# Patient Record
Sex: FEMALE | Race: OTHER | URBAN - METROPOLITAN AREA
[De-identification: names, ages, dates, MRNs, and addresses within clinical notes are randomized per-mention and may not be internally consistent; named-entity substitution may affect disease eponyms.]

---

## 2018-04-01 ENCOUNTER — EMERGENCY (EMERGENCY)
Facility: HOSPITAL | Age: 68
LOS: 1 days | Discharge: HOME | End: 2018-04-01
Attending: EMERGENCY MEDICINE | Admitting: INTERNAL MEDICINE
Payer: MEDICARE

## 2018-04-01 VITALS
SYSTOLIC BLOOD PRESSURE: 153 MMHG | RESPIRATION RATE: 17 BRPM | OXYGEN SATURATION: 100 % | HEART RATE: 74 BPM | DIASTOLIC BLOOD PRESSURE: 95 MMHG | TEMPERATURE: 98 F

## 2018-04-01 DIAGNOSIS — I63.9 CEREBRAL INFARCTION, UNSPECIFIED: ICD-10-CM

## 2018-04-01 DIAGNOSIS — Z91.013 ALLERGY TO SEAFOOD: ICD-10-CM

## 2018-04-01 DIAGNOSIS — R11.0 NAUSEA: ICD-10-CM

## 2018-04-01 LAB
ALBUMIN SERPL ELPH-MCNC: 3.6 G/DL — SIGNIFICANT CHANGE UP (ref 3.3–5)
ALP SERPL-CCNC: 79 U/L — SIGNIFICANT CHANGE UP (ref 40–120)
ALT FLD-CCNC: 28 U/L — SIGNIFICANT CHANGE UP (ref 12–78)
ANION GAP SERPL CALC-SCNC: 9 MMOL/L — SIGNIFICANT CHANGE UP (ref 5–17)
APTT BLD: 31.3 SEC — SIGNIFICANT CHANGE UP (ref 27.5–37.4)
AST SERPL-CCNC: 23 U/L — SIGNIFICANT CHANGE UP (ref 15–37)
BASOPHILS # BLD AUTO: 0.05 K/UL — SIGNIFICANT CHANGE UP (ref 0–0.2)
BASOPHILS NFR BLD AUTO: 0.9 % — SIGNIFICANT CHANGE UP (ref 0–2)
BILIRUB SERPL-MCNC: 0.3 MG/DL — SIGNIFICANT CHANGE UP (ref 0.2–1.2)
BLD GP AB SCN SERPL QL: SIGNIFICANT CHANGE UP
BUN SERPL-MCNC: 11 MG/DL — SIGNIFICANT CHANGE UP (ref 7–23)
CALCIUM SERPL-MCNC: 8.8 MG/DL — SIGNIFICANT CHANGE UP (ref 8.5–10.1)
CHLORIDE SERPL-SCNC: 106 MMOL/L — SIGNIFICANT CHANGE UP (ref 96–108)
CK MB BLD-MCNC: 1.9 % — SIGNIFICANT CHANGE UP (ref 0–3.5)
CK MB CFR SERPL CALC: 1.2 NG/ML — SIGNIFICANT CHANGE UP (ref 0.5–3.6)
CK SERPL-CCNC: 63 U/L — SIGNIFICANT CHANGE UP (ref 26–192)
CO2 SERPL-SCNC: 28 MMOL/L — SIGNIFICANT CHANGE UP (ref 22–31)
CREAT SERPL-MCNC: 0.55 MG/DL — SIGNIFICANT CHANGE UP (ref 0.5–1.3)
EOSINOPHIL # BLD AUTO: 0.18 K/UL — SIGNIFICANT CHANGE UP (ref 0–0.5)
EOSINOPHIL NFR BLD AUTO: 3.1 % — SIGNIFICANT CHANGE UP (ref 0–6)
GLUCOSE BLDC GLUCOMTR-MCNC: 100 MG/DL — HIGH (ref 70–99)
GLUCOSE SERPL-MCNC: 111 MG/DL — HIGH (ref 70–99)
HCT VFR BLD CALC: 37.7 % — SIGNIFICANT CHANGE UP (ref 34.5–45)
HGB BLD-MCNC: 12.3 G/DL — SIGNIFICANT CHANGE UP (ref 11.5–15.5)
IMM GRANULOCYTES NFR BLD AUTO: 0.2 % — SIGNIFICANT CHANGE UP (ref 0–1.5)
INR BLD: 1.03 RATIO — SIGNIFICANT CHANGE UP (ref 0.88–1.16)
LYMPHOCYTES # BLD AUTO: 1.8 K/UL — SIGNIFICANT CHANGE UP (ref 1–3.3)
LYMPHOCYTES # BLD AUTO: 31 % — SIGNIFICANT CHANGE UP (ref 13–44)
MCHC RBC-ENTMCNC: 28.3 PG — SIGNIFICANT CHANGE UP (ref 27–34)
MCHC RBC-ENTMCNC: 32.6 GM/DL — SIGNIFICANT CHANGE UP (ref 32–36)
MCV RBC AUTO: 86.7 FL — SIGNIFICANT CHANGE UP (ref 80–100)
MONOCYTES # BLD AUTO: 0.39 K/UL — SIGNIFICANT CHANGE UP (ref 0–0.9)
MONOCYTES NFR BLD AUTO: 6.7 % — SIGNIFICANT CHANGE UP (ref 2–14)
NEUTROPHILS # BLD AUTO: 3.38 K/UL — SIGNIFICANT CHANGE UP (ref 1.8–7.4)
NEUTROPHILS NFR BLD AUTO: 58.1 % — SIGNIFICANT CHANGE UP (ref 43–77)
NRBC # BLD: 0 /100 WBCS — SIGNIFICANT CHANGE UP (ref 0–0)
PLATELET # BLD AUTO: 229 K/UL — SIGNIFICANT CHANGE UP (ref 150–400)
POTASSIUM SERPL-MCNC: 3.9 MMOL/L — SIGNIFICANT CHANGE UP (ref 3.5–5.3)
POTASSIUM SERPL-SCNC: 3.9 MMOL/L — SIGNIFICANT CHANGE UP (ref 3.5–5.3)
PROT SERPL-MCNC: 7.2 GM/DL — SIGNIFICANT CHANGE UP (ref 6–8.3)
PROTHROM AB SERPL-ACNC: 11.2 SEC — SIGNIFICANT CHANGE UP (ref 9.8–12.7)
RBC # BLD: 4.35 M/UL — SIGNIFICANT CHANGE UP (ref 3.8–5.2)
RBC # FLD: 13.2 % — SIGNIFICANT CHANGE UP (ref 10.3–14.5)
SODIUM SERPL-SCNC: 143 MMOL/L — SIGNIFICANT CHANGE UP (ref 135–145)
TROPONIN I SERPL-MCNC: <.015 NG/ML — SIGNIFICANT CHANGE UP (ref 0.01–0.04)
WBC # BLD: 5.81 K/UL — SIGNIFICANT CHANGE UP (ref 3.8–10.5)
WBC # FLD AUTO: 5.81 K/UL — SIGNIFICANT CHANGE UP (ref 3.8–10.5)

## 2018-04-01 PROCEDURE — 70450 CT HEAD/BRAIN W/O DYE: CPT | Mod: 26

## 2018-04-01 PROCEDURE — 71045 X-RAY EXAM CHEST 1 VIEW: CPT | Mod: 26

## 2018-04-01 PROCEDURE — 99291 CRITICAL CARE FIRST HOUR: CPT

## 2018-04-01 PROCEDURE — 93010 ELECTROCARDIOGRAM REPORT: CPT

## 2018-04-01 RX ORDER — ASPIRIN/CALCIUM CARB/MAGNESIUM 324 MG
1 TABLET ORAL
Qty: 0 | Refills: 0 | COMMUNITY

## 2018-04-01 RX ORDER — ASPIRIN/CALCIUM CARB/MAGNESIUM 324 MG
81 TABLET ORAL DAILY
Qty: 0 | Refills: 0 | Status: DISCONTINUED | OUTPATIENT
Start: 2018-04-01 | End: 2018-04-02

## 2018-04-01 NOTE — ED PROVIDER NOTE - PROGRESS NOTE DETAILS
Telestroke consult Dr. Schmitt agreed that risks outweigh benefits for tpa given slight and improving symptoms. Pt and family agree, and pt admitted for further evaluation.

## 2018-04-01 NOTE — ED PROVIDER NOTE - OBJECTIVE STATEMENT
Pt is a 67 yo lady with a pmhx of TIA, who presents to the ED with L. sided hand heaviness and numbness. This started around 11:30 AM while with her daughter. No headache, no chest pain, no sob, no slurred speech, no facial droop. Feels hand is slightly improved from onset, but still feels abnormal. On baby asa, no anticoagulation. No abdominal pain.

## 2018-04-01 NOTE — ED ADULT TRIAGE NOTE - CHIEF COMPLAINT QUOTE
home became lightheaded, floaters in eyes, nausea, takes no meds, no hx, poss tia few years back not confirmed.  no allergies

## 2018-04-01 NOTE — CONSULT NOTE ADULT - ASSESSMENT
Historian:     Patient.  ER notes also reviewed.  HPI:  MAUREEN CAGLE.  Pt is a 67 yo lady with a pmhx of TIA, who presents to the ED with L. sided hand heaviness and numbness. This started around 11:30 AM while with her daughter. No headache, no chest pain, no sob, no slurred speech, no facial droop. Feels hand is slightly improved from onset, but still feels abnormal. On baby asa, no anticoagulation. No abdominal pain. (2018 16:46)      PAST MEDICAL & SURGICAL HISTORY:  No pertinent past medical history  Herniated disc  Cataract of right eye   deliv NOS-unsp  68yFemale    MEDICATIONS  (STANDING):  aspirin enteric coated 81 milliGRAM(s) Oral daily    MEDICATIONS  (PRN):      Allergies    No Known Drug Allergies  shellfish (Pruritus; Rash)    Intolerances      FAMILY HISTORY:    Vital Signs Last 24 Hrs  T(C): 36.9 (2018 16:10), Max: 36.9 (2018 16:10)  T(F): 98.4 (2018 16:10), Max: 98.4 (2018 16:10)  HR: 74 (2018 16:10) (74 - 74)  BP: 142/71 (2018 16:10) (142/71 - 153/95)  BP(mean): --  RR: 16 (2018 16:10) (16 - 17)  SpO2: 100% (2018 16:10) (100% - 100%)    NEUROLOGICAL EXAM:  HENT:  Normocephalic head; atraumatic head.  Neck supple.  ENT: normal looking.  Mental State:    Alert.  Fully oriented to person, place and date.  Coherent.  Speech clear and intact.  Cooperative.  Responds appropriately.    Cranial Nerves:  II-XII:   Pupils round and reactive to light and accommodation.  Extraocular movements full.  Visual fields full (no homonymous hemianopsia).  Visual acuity wnl.  Facial symmetry intact.  Tongue midline.  Motor Functions:  Moves all extremities.  No pronator drift of UE.  Claps hand well.  Hand  intact bilaterally.  Ambulatory.    Sensory Functions:   Intact to touch and pinprick to face and extremities.    Reflexes:  Deep tendon reflexes normoactive to biceps, knees and ankles.  Babinski absent (present).  Cerebellar Testing:    Finger to nose intact.  Nystagmus absent.  Neurovascular: Carotid auscultation full without bruits.      LABS:                        12.3   5.81  )-----------( 229      ( 2018 14:08 )             37.7         143  |  106  |  11  ----------------------------<  111<H>  3.9   |  28  |  0.55    Ca    8.8      2018 14:08    TPro  7.2  /  Alb  3.6  /  TBili  0.3  /  DBili  x   /  AST  23  /  ALT  28  /  AlkPhos  79      PT/INR - ( 2018 14:08 )   PT: 11.2 sec;   INR: 1.03 ratio         PTT - ( 2018 14:08 )  PTT:31.3 sec        RADIOLOGY & ADDITIONAL STUDIES:    Vital Signs:     Frequency:  every 15 minutes    Additional Instructions:  CODE STROKE ( @ 13:36)  Blood Glucose Point Of Care Testing:     Frequency:  once    Additional Instructions:  CODE STROKE ( @ 13:36)  Assess Neurological Status:     Type of Neuro Check:  Stroke    Frequency:  every 15 minutes    Additional Instructions:  CODE STROKE ( @ 13:36)  Cardiac Monitor Bedside:     Time/Priority:  Routine    Additional Instructions:  CODE STROKE; Continuous ( @ 13:36)  Pulse Oximetry:   Frequency: <Continuous>    Additional Instructions:  CODE STROKE ( @ 13:36)  Diet, NPO:      Special Instructions for Nursing:  CODE STROKE; NPO until Dysphagia screen or Speech Bedside Swallow Evaluation completed and passed ( @ 13:36)  Comprehensive Metabolic Panel: STAT ( @ 13:36)  Complete Blood Count + Automated Diff: STAT ( @ 13:36)  Activated Partial Thromboplastin Time:  Start Date:2018. STAT ( @ 13:36)  Prothrombin Time and INR, Plasma:  Start Date:2018. STAT ( @ 13:36)  Creatine Kinase, Serum: STAT ( @ 13:36)  CKMB Mass Assay: STAT ( @ 13:36)  Troponin I, Serum: STAT ( @ 13:36)  Xray Chest 1 View AP/PA.: Urgent   Indication: stroke  Transport: Portable  Exam Completed  Provider's Contact #: [phone] ( @ 13:36)  12 Lead ECG:   Provider's Contact #: [phone] ( @ 13:36)  Type + Screen: STAT ( @ 13:36)  ABO Rh Confirmatory Specimen: STAT  Addl Info: Conditional: ABO Rh Confirmatory Specimen ( @ 13:36)  CT Brain Stroke Protocol: Urgent   Indication: Code Stroke  Transport: Stretcher-Crib  Exam Completed  Provider's Contact #: [phone] ( @ 13:36)  Nucleated RBC: 14:00 ( @ 14:09)  Antibody Screen: 14:00 ( @ 14:09)  POCT  Blood Glucose: 15:00 ( @ 15:05)  Admit to Inpatient Level of Care.:     Service:  TELEMETRY    Physician:  Margarita Camp    Additional Instructions:  Diagnosis: Cerebrovascular accident (CVA), unspecified mechanism  Isolation: None  Special Consideration: None ( @ 15:17)  Admit from ED ( @ 15:45)  Admit to Inpatient Level of Care.:     LVS 2D    Service:  Telemetry    Physician:  tasha ( @ 16:45)  aspirin enteric coated: [Known as Ecotrin]  81 milliGRAM(s), Oral, daily  Administration Instructions: swallow whole * don't crush/chew  Provider's Contact #: [phone] ( @ 16:47)  TTE Echo Doppler w/o Cont:   Transport: Stretcher-Crib  Monitor: w/o Monitor  Provider's Contact #: [phone] ( @ 16:49)  US Duplex Carotid Arteries Complete, Bilateral: Routine   Indication: cva  Transport: Stretcher-Crib  Provider's Contact #: [phone] ( @ 16:49)  MR Head No Cont: Routine   Indication: tia  Transport: Stretcher-Crib  Provider's Contact #: [phone] ( @ 16:49)      Assessment & Opinion:  Probable transient ischemic attack.  Presently with unremarkable neurologic exam    Recommendations:  Brain MRI.  Carotid doppler.  Echocardiogram.  EEG.   DVT prophylaxis as ordered.  Medications:  Continue all meds Historian:     Patient.  ER notes also reviewed.  HPI:  MAUREEN CAGLE.  Pt is a 67 yo lady with a pmhx of TIA, who presents to the ED with L. sided hand heaviness and numbness. This started around 11:30 AM while with her daughter. No headache, no chest pain, no sob, no slurred speech, no facial droop. Feels hand is slightly improved from onset, but still feels abnormal. On baby asa, no anticoagulation. No abdominal pain. (2018 16:46)    PAST MEDICAL & SURGICAL HISTORY: No pertinent past medical history; Herniated disc; Cataract of right eye;  deliv NOS-unsp    MEDICATIONS  (STANDING):  aspirin enteric coated 81 milliGRAM(s) Oral daily    MEDICATIONS  (PRN):  Allergies: No Known Drug Allergies  shellfish (Pruritus; Rash)    Intolerances    FAMILY HISTORY:    Vital Signs Last 24 Hrs  T(C): 36.9 (2018 16:10), Max: 36.9 (2018 16:10)  T(F): 98.4 (2018 16:10), Max: 98.4 (2018 16:10)  HR: 74 (2018 16:10) (74 - 74)  BP: 142/71 (2018 16:10) (142/71 - 153/95)  BP(mean): --  RR: 16 (2018 16:10) (16 - 17)  SpO2: 100% (2018 16:10) (100% - 100%)    NEUROLOGICAL EXAM:  HENT:  Normocephalic head; atraumatic head.  Neck supple.  ENT: normal looking.  Mental State:    Alert.  Fully oriented to person, place and date.  Coherent.  Speech clear and intact.  Cooperative.  Responds appropriately.    Cranial Nerves:  II-XII:   Pupils round and reactive to light and accommodation.  Extraocular movements full.  Visual fields full (no homonymous hemianopsia).  Visual acuity wnl.  Facial symmetry intact.  Tongue midline.  Motor Functions:  Moves all extremities.  No pronator drift of UE.  Claps hand well.  Hand  intact bilaterally.  Ambulatory.    Sensory Functions:   Intact to touch and pinprick to face and extremities.    Reflexes:  Deep tendon reflexes normoactive to biceps, knees and ankles.  Babinski absent (present).  Cerebellar Testing:    Finger to nose intact.  Nystagmus absent.  Neurovascular: Carotid auscultation full without bruits.      LABS:                        12.3   5.81  )-----------( 229      ( 2018 14:08 )             37.7         143  |  106  |  11  ----------------------------<  111<H>  3.9   |  28  |  0.55    Ca    8.8      2018 14:08    TPro  7.2  /  Alb  3.6  /  TBili  0.3  /  DBili  x   /  AST  23  /  ALT  28  /  AlkPhos  79      PT/INR - ( 2018 14:08 )   PT: 11.2 sec;   INR: 1.03 ratio         PTT - ( 2018 14:08 )  PTT:31.3 sec        RADIOLOGY & ADDITIONAL STUDIES:    Vital Signs:     Frequency:  every 15 minutes    Additional Instructions:  CODE STROKE ( @ 13:36)  Blood Glucose Point Of Care Testing:     Frequency:  once    Additional Instructions:  CODE STROKE ( @ 13:36)  Assess Neurological Status:     Type of Neuro Check:  Stroke    Frequency:  every 15 minutes    Additional Instructions:  CODE STROKE ( @ 13:36)  Cardiac Monitor Bedside:     Time/Priority:  Routine    Additional Instructions:  CODE STROKE; Continuous ( @ 13:36)  Pulse Oximetry:   Frequency: <Continuous>    Additional Instructions:  CODE STROKE ( @ 13:36)  Diet, NPO:      Special Instructions for Nursing:  CODE STROKE; NPO until Dysphagia screen or Speech Bedside Swallow Evaluation completed and passed ( @ 13:36)  Comprehensive Metabolic Panel: STAT ( @ 13:36)  Complete Blood Count + Automated Diff: STAT ( @ 13:36)  Activated Partial Thromboplastin Time:  Start Date:2018. STAT ( @ 13:36)  Prothrombin Time and INR, Plasma:  Start Date:2018. STAT ( @ 13:36)  Creatine Kinase, Serum: STAT ( @ 13:36)  CKMB Mass Assay: STAT ( @ 13:36)  Troponin I, Serum: STAT ( @ 13:36)  Xray Chest 1 View AP/PA.: Urgent   Indication: stroke  Transport: Portable  Exam Completed  Provider's Contact #: [phone] ( @ 13:36)  12 Lead ECG:   Provider's Contact #: [phone] ( @ 13:36)  Type + Screen: STAT ( @ 13:36)  ABO Rh Confirmatory Specimen: STAT  Addl Info: Conditional: ABO Rh Confirmatory Specimen ( @ 13:36)  CT Brain Stroke Protocol: Urgent   Indication: Code Stroke  Transport: Stretcher-Crib  Exam Completed  Provider's Contact #: [phone] ( @ 13:36)  Nucleated RBC: 14:00 ( @ 14:09)  Antibody Screen: 14:00 ( @ 14:09)  POCT  Blood Glucose: 15:00 ( @ 15:05)  Admit to Inpatient Level of Care.:     Service:  TELEMETRY    Physician:  Margarita Camp    Additional Instructions:  Diagnosis: Cerebrovascular accident (CVA), unspecified mechanism  Isolation: None  Special Consideration: None ( @ 15:17)  Admit from ED ( @ 15:45)  Admit to Inpatient Level of Care.:     LVS 2D    Service:  Telemetry    Physician:  tasha ( @ 16:45)  aspirin enteric coated: [Known as Ecotrin]  81 milliGRAM(s), Oral, daily  Administration Instructions: swallow whole * don't crush/chew  Provider's Contact #: [phone] ( @ 16:47)  TTE Echo Doppler w/o Cont:   Transport: Stretcher-Crib  Monitor: w/o Monitor  Provider's Contact #: [phone] ( @ 16:49)  US Duplex Carotid Arteries Complete, Bilateral: Routine   Indication: cva  Transport: Stretcher-Crib  Provider's Contact #: [phone] ( @ 16:49)  MR Head No Cont: Routine   Indication: tia  Transport: Stretcher-Crib  Provider's Contact #: [phone] ( @ 16:49)      Assessment & Opinion:  Probable transient ischemic attack.  Presently with unremarkable neurologic exam    Recommendations:  Brain MRI.  Carotid doppler.  Echocardiogram.  EEG.   DVT prophylaxis as ordered.  Medications:  Continue all meds Historian:     Patient.  ER notes also reviewed.  Brain CT:  No acute pathology  HPI:  MAUREEN CAGLE.  Pt is a 67 yo lady with a pmhx of TIA, who presents to the ED with L. sided hand heaviness and numbness. This started around 11:30 AM while with her daughter. No headache, no chest pain, no sob, no slurred speech, no facial droop. Feels hand is slightly improved from onset, but still feels abnormal. On baby asa, no anticoagulation. No abdominal pain. (2018 16:46)    PAST MEDICAL & SURGICAL HISTORY: No pertinent past medical history; Herniated disc; Cataract of right eye;  deliv NOS-unsp    MEDICATIONS  (STANDING):  aspirin enteric coated 81 milliGRAM(s) Oral daily    MEDICATIONS  (PRN):     Allergies: No Known Drug Allergies  shellfish (Pruritus; Rash)    Intolerances    FAMILY HISTORY:    Vital Signs Last 24 Hrs  T(C): 36.9 (2018 16:10), Max: 36.9 (2018 16:10)  T(F): 98.4 (2018 16:10), Max: 98.4 (2018 16:10)  HR: 74 (2018 16:10) (74 - 74)  BP: 142/71 (2018 16:10) (142/71 - 153/95)  BP(mean): --  RR: 16 (2018 16:10) (16 - 17)  SpO2: 100% (2018 16:10) (100% - 100%)    NEUROLOGICAL EXAM:  HENT:  Normocephalic head; atraumatic head.  Neck supple.  ENT: normal looking.  Mental State:    Alert.  Fully oriented to person, place and date.  Coherent.  Speech clear and intact.  Cooperative.  Responds appropriately.    Cranial Nerves:  II-XII:   Pupils round and reactive to light and accommodation.  Extraocular movements full.  Visual fields full (no homonymous hemianopsia).  Visual acuity wnl.  Facial symmetry intact.  Tongue midline.  Motor Functions:  Moves all extremities.  No pronator drift of UE.  Claps hands well.  Hand  intact bilaterally.  Ambulatory.    Sensory Functions:   Intact to touch and pinprick to face and extremities.    Reflexes:  Deep tendon reflexes normoactive to biceps, knees and ankles.    Cerebellar Testing:    Finger to nose intact.  Nystagmus absent.  Neurovascular: Carotid auscultation full without bruits.      LABS:                        12.3   5.81  )-----------( 229      ( 2018 14:08 )             37.7         143  |  106  |  11  ----------------------------<  111<H>  3.9   |  28  |  0.55    Ca    8.8      2018 14:08    TPro  7.2  /  Alb  3.6  /  TBili  0.3  /  DBili  x   /  AST  23  /  ALT  28  /  AlkPhos  79      PT/INR - ( 2018 14:08 )   PT: 11.2 sec;   INR: 1.03 ratio         PTT - ( 2018 14:08 )  PTT:31.3 sec        RADIOLOGY & ADDITIONAL STUDIES:    Vital Signs:     Frequency:  every 15 minutes    Additional Instructions:  CODE STROKE ( @ 13:36)  Blood Glucose Point Of Care Testing:     Frequency:  once    Additional Instructions:  CODE STROKE ( @ 13:36)  Assess Neurological Status:     Type of Neuro Check:  Stroke    Frequency:  every 15 minutes    Additional Instructions:  CODE STROKE ( @ 13:36)  Cardiac Monitor Bedside:     Time/Priority:  Routine    Additional Instructions:  CODE STROKE; Continuous ( @ 13:36)  Pulse Oximetry:   Frequency: <Continuous>    Additional Instructions:  CODE STROKE ( @ 13:36)  Diet, NPO:      Special Instructions for Nursing:  CODE STROKE; NPO until Dysphagia screen or Speech Bedside Swallow Evaluation completed and passed ( @ 13:36)  Comprehensive Metabolic Panel: STAT ( @ 13:36)  Complete Blood Count + Automated Diff: STAT ( @ 13:36)  Activated Partial Thromboplastin Time:  Start Date:2018. STAT ( @ 13:36)  Prothrombin Time and INR, Plasma:  Start Date:2018. STAT ( @ 13:36)  Creatine Kinase, Serum: STAT ( @ 13:36)  CKMB Mass Assay: STAT ( @ 13:36)  Troponin I, Serum: STAT ( @ 13:36)  Xray Chest 1 View AP/PA.: Urgent   Indication: stroke  Transport: Portable  Exam Completed  Provider's Contact #: [phone] ( @ 13:36)  12 Lead ECG:   Provider's Contact #: [phone] ( @ 13:36)  Type + Screen: STAT ( @ 13:36)  ABO Rh Confirmatory Specimen: STAT  Addl Info: Conditional: ABO Rh Confirmatory Specimen ( @ 13:36)  CT Brain Stroke Protocol: Urgent   Indication: Code Stroke  Transport: Stretcher-Crib  Exam Completed  Provider's Contact #: [phone] ( @ 13:36)  Nucleated RBC: 14:00 ( @ 14:09)  Antibody Screen: 14:00 ( @ 14:09)  POCT  Blood Glucose: 15:00 ( @ 15:05)  Admit to Inpatient Level of Care.:     Service:  TELEMETRY    Physician:  Margarita Camp    Additional Instructions:  Diagnosis: Cerebrovascular accident (CVA), unspecified mechanism  Isolation: None  Special Consideration: None ( @ 15:17)  Admit from ED ( @ 15:45)  Admit to Inpatient Level of Care.:     LVS 2D    Service:  Telemetry    Physician:  tasha ( @ 16:45)  aspirin enteric coated: [Known as Ecotrin]  81 milliGRAM(s), Oral, daily  Administration Instructions: swallow whole * don't crush/chew  Provider's Contact #: [phone] ( @ 16:47)  TTE Echo Doppler w/o Cont:   Transport: Stretcher-Crib  Monitor: w/o Monitor  Provider's Contact #: [phone] ( @ 16:49)  US Duplex Carotid Arteries Complete, Bilateral: Routine   Indication: cva  Transport: Stretcher-Crib  Provider's Contact #: [phone] ( @ 16:49)  MR Head No Cont: Routine   Indication: tia  Transport: Stretcher-Crib  Provider's Contact #: [phone] ( @ 16:49)    Assessment & Opinion:  Probable transient ischemic attack.  Presently with unremarkable neurologic exam    Recommendations:  Brain MRI.  Carotid doppler.  Echocardiogram.  DVT prophylaxis as ordered.  Medications:  Continue all meds

## 2018-04-02 ENCOUNTER — TRANSCRIPTION ENCOUNTER (OUTPATIENT)
Age: 68
End: 2018-04-02

## 2018-04-02 VITALS
SYSTOLIC BLOOD PRESSURE: 141 MMHG | RESPIRATION RATE: 17 BRPM | TEMPERATURE: 98 F | HEART RATE: 76 BPM | DIASTOLIC BLOOD PRESSURE: 87 MMHG | OXYGEN SATURATION: 98 %

## 2018-04-02 PROCEDURE — 70551 MRI BRAIN STEM W/O DYE: CPT | Mod: 26

## 2018-04-02 PROCEDURE — 93880 EXTRACRANIAL BILAT STUDY: CPT | Mod: 26

## 2018-04-02 RX ADMIN — Medication 81 MILLIGRAM(S): at 11:23

## 2018-04-02 NOTE — DISCHARGE NOTE ADULT - MEDICATION SUMMARY - MEDICATIONS TO TAKE
I will START or STAY ON the medications listed below when I get home from the hospital:    aspirin 81 mg oral tablet  -- 1 tab(s) by mouth once a day  -- Indication: For tia

## 2018-04-02 NOTE — DISCHARGE NOTE ADULT - PATIENT PORTAL LINK FT
You can access the [facility], offered by [facility], by registering with the following website: [email or web address]

## 2018-04-02 NOTE — PROGRESS NOTE ADULT - SUBJECTIVE AND OBJECTIVE BOX
INTERVAL HPI/OVERNIGHT EVENTS:  Subjective Complaints:  Offers no complaints.   In no acute distress; moves all extremities; speech clear.      RECENT RADIOLOGY & ADDITIONAL TESTS: Tests scheduled fro today    NEUROLOGICAL EXAM (Pertinent):  Vital Signs Last 24 Hrs  T(C): 36.9 (02 Apr 2018 11:21), Max: 36.9 (01 Apr 2018 16:10)  T(F): 98.4 (02 Apr 2018 11:21), Max: 98.4 (01 Apr 2018 16:10)  HR: 66 (02 Apr 2018 11:21) (62 - 77)  BP: 149/76 (02 Apr 2018 11:21) (137/64 - 162/63)  BP(mean): --  RR: 16 (02 Apr 2018 11:21) (16 - 18)  SpO2: 98% (02 Apr 2018 11:21) (97% - 100%)    MEDICATIONS  (STANDING):  aspirin enteric coated 81 milliGRAM(s) Oral daily    MEDICATIONS  (PRN):    LABS:                        12.3   5.81  )-----------( 229      ( 01 Apr 2018 14:08 )             37.7     04-01    143  |  106  |  11  ----------------------------<  111<H>  3.9   |  28  |  0.55    Ca    8.8      01 Apr 2018 14:08    TPro  7.2  /  Alb  3.6  /  TBili  0.3  /  DBili  x   /  AST  23  /  ALT  28  /  AlkPhos  79  04-01    PT/INR - ( 01 Apr 2018 14:08 )   PT: 11.2 sec;   INR: 1.03 ratio         PTT - ( 01 Apr 2018 14:08 )  PTT:31.3 sec    Assessment & Opinion:  Probable transient ischemic attack.  Presently with unremarkable neurologic exam    Recommendations:  Brain MRI (ordered).  Carotid doppler.  Echocardiogram.  DVT prophylaxis as ordered.  Medications:  Continue all meds

## 2018-04-02 NOTE — CHART NOTE - NSCHARTNOTEFT_GEN_A_CORE
IMPROVE VTE Individual Risk Assessment          RISK                                                          Points    [  ] Previous VTE                                                3    [  ] Thrombophilia                                             2    [  ] Lower limb paralysis                                    2        (unable to hold up >15 seconds)      [  ] Current Cancer                                             2         (within 6 months)    [  ] Immobilization > 24 hrs                              1    [  ] ICU/CCU stay > 24 hours                            1    [ X ] Age > 60                                                    1    IMPROVE VTE Score ____X_____    Low risk 0-1: [ X ] Early ambulation                          [  ] Intermittent Compression Device    High Risk 2-12: [  ] Heparin 5,000 units SC Q8 H                              [  ] Heparin 5,000 units SC Q 12 H                              [  ] LMWH 40 mg SC daily (CrCl > 30 ml)

## 2018-04-02 NOTE — DISCHARGE NOTE ADULT - CARE PROVIDER_API CALL
Margarita Camp), Internal Medicine  [address] [place] [address]  Phone: [phone]  Fax: [phone]

## 2018-04-02 NOTE — DISCHARGE NOTE ADULT - CARE PLAN
Principal Discharge DX:	Transient cerebral ischemia, unspecified type  Goal:	full resolution secondary prevention  Assessment and plan of treatment:	as above

## 2018-04-02 NOTE — DISCHARGE NOTE ADULT - HOSPITAL COURSE
Pt is a 69 yo lady with a pmhx of TIA, who presents to the ED with L. sided hand heaviness and numbness. This started around 11:30 AM while with her daughter. No headache, no chest pain, no sob, no slurred speech, no facial droop. Feels hand is slightly improved from onset, but still feels abnormal. On baby asa, no anticoagulation. No abdominal pain.  Work up for CVA negative.

## 2021-12-08 ENCOUNTER — APPOINTMENT (OUTPATIENT)
Dept: OTOLARYNGOLOGY | Facility: CLINIC | Age: 71
End: 2021-12-08
Payer: MEDICARE

## 2021-12-08 VITALS — HEIGHT: 64 IN | BODY MASS INDEX: 27.14 KG/M2 | WEIGHT: 159 LBS

## 2021-12-08 DIAGNOSIS — Z83.3 FAMILY HISTORY OF DIABETES MELLITUS: ICD-10-CM

## 2021-12-08 DIAGNOSIS — Z78.9 OTHER SPECIFIED HEALTH STATUS: ICD-10-CM

## 2021-12-08 DIAGNOSIS — K21.9 GASTRO-ESOPHAGEAL REFLUX DISEASE W/OUT ESOPHAGITIS: ICD-10-CM

## 2021-12-08 DIAGNOSIS — Z86.79 PERSONAL HISTORY OF OTHER DISEASES OF THE CIRCULATORY SYSTEM: ICD-10-CM

## 2021-12-08 DIAGNOSIS — Z82.49 FAMILY HISTORY OF ISCHEMIC HEART DISEASE AND OTHER DISEASES OF THE CIRCULATORY SYSTEM: ICD-10-CM

## 2021-12-08 DIAGNOSIS — R49.0 DYSPHONIA: ICD-10-CM

## 2021-12-08 PROCEDURE — 31575 DIAGNOSTIC LARYNGOSCOPY: CPT

## 2021-12-08 PROCEDURE — 99203 OFFICE O/P NEW LOW 30 MIN: CPT | Mod: 25

## 2021-12-08 RX ORDER — ASPIRIN 81 MG
81 TABLET, DELAYED RELEASE (ENTERIC COATED) ORAL
Refills: 0 | Status: ACTIVE | COMMUNITY

## 2021-12-08 RX ORDER — LISINOPRIL AND HYDROCHLOROTHIAZIDE TABLETS 10; 12.5 MG/1; MG/1
TABLET ORAL
Refills: 0 | Status: ACTIVE | COMMUNITY

## 2021-12-08 RX ORDER — ATORVASTATIN CALCIUM 80 MG/1
TABLET, FILM COATED ORAL
Refills: 0 | Status: ACTIVE | COMMUNITY

## 2021-12-08 NOTE — HISTORY OF PRESENT ILLNESS
[de-identified] : MAUREEN CAGLE is a 71 year woman with a history of several days of throat pain radiating to the right ear and hoarseness in the morning and choking. She started Lisinopril December 1. She didn’t take it today and feels a bit better.

## 2021-12-08 NOTE — REVIEW OF SYSTEMS
[Negative] : Heme/Lymph [Patient Intake Form Reviewed] : Patient intake form was reviewed [de-identified] : sore throat and difficulty swollowing

## 2021-12-08 NOTE — ASSESSMENT
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 She has discontinued it and will speak with Dr. Burns. I suggested and discussed in detail a strict reflux diet and gave the patient a handout.\par I am recommending Pepcid 20mg  bid.\par \par

## 2022-11-04 NOTE — DISCHARGE NOTE ADULT - NS AS DC STROKE ED MATERIALS
show Stroke Warning Signs and Symptoms/Call 911 for Stroke/Risk Factors for Stroke/Stroke Education Booklet/Need for Followup After Discharge/Prescribed Medications

## 2023-02-28 ENCOUNTER — APPOINTMENT (OUTPATIENT)
Dept: PULMONOLOGY | Facility: CLINIC | Age: 73
End: 2023-02-28